# Patient Record
Sex: FEMALE | Race: WHITE
[De-identification: names, ages, dates, MRNs, and addresses within clinical notes are randomized per-mention and may not be internally consistent; named-entity substitution may affect disease eponyms.]

---

## 2018-01-18 ENCOUNTER — RESULT REVIEW (OUTPATIENT)
Age: 58
End: 2018-01-18

## 2018-11-13 ENCOUNTER — RESULT REVIEW (OUTPATIENT)
Age: 58
End: 2018-11-13

## 2021-01-18 ENCOUNTER — APPOINTMENT (OUTPATIENT)
Dept: OTOLARYNGOLOGY | Facility: CLINIC | Age: 61
End: 2021-01-18
Payer: COMMERCIAL

## 2021-01-18 VITALS
SYSTOLIC BLOOD PRESSURE: 131 MMHG | WEIGHT: 245 LBS | DIASTOLIC BLOOD PRESSURE: 80 MMHG | TEMPERATURE: 97.6 F | HEIGHT: 71 IN | BODY MASS INDEX: 34.3 KG/M2

## 2021-01-18 DIAGNOSIS — H69.81 OTHER SPECIFIED DISORDERS OF EUSTACHIAN TUBE, RIGHT EAR: ICD-10-CM

## 2021-01-18 DIAGNOSIS — Z78.9 OTHER SPECIFIED HEALTH STATUS: ICD-10-CM

## 2021-01-18 DIAGNOSIS — Z80.9 FAMILY HISTORY OF MALIGNANT NEOPLASM, UNSPECIFIED: ICD-10-CM

## 2021-01-18 DIAGNOSIS — H92.02 OTALGIA, LEFT EAR: ICD-10-CM

## 2021-01-18 PROCEDURE — 92570 ACOUSTIC IMMITANCE TESTING: CPT

## 2021-01-18 PROCEDURE — 92557 COMPREHENSIVE HEARING TEST: CPT

## 2021-01-18 PROCEDURE — 92588 EVOKED AUDITORY TST COMPLETE: CPT

## 2021-01-18 PROCEDURE — 99072 ADDL SUPL MATRL&STAF TM PHE: CPT

## 2021-01-18 PROCEDURE — 99244 OFF/OP CNSLTJ NEW/EST MOD 40: CPT

## 2021-01-18 PROCEDURE — 92563 TONE DECAY HEARING TEST: CPT

## 2021-01-18 RX ORDER — AZELASTINE HYDROCHLORIDE 137 UG/1
137 SPRAY, METERED NASAL TWICE DAILY
Qty: 1 | Refills: 5 | Status: ACTIVE | COMMUNITY
Start: 2021-01-18 | End: 1900-01-01

## 2021-01-18 NOTE — ASSESSMENT
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 Jung and reviewed with the patient. Astelin - 2 sprays bilaterally BID, spray laterally. ABR. FU after test.

## 2021-01-18 NOTE — ADDENDUM
[FreeTextEntry1] : Documented by Estrella Pena acting as scribe for Dr. Jung on 01/18/2021.\par \par All Medical record entries made by the Scribe were at my, Dr. Jung, direction and personally dictated by me on 01/18/2021 . I have reviewed the chart and agree that the record accurately reflects my personal performance of the history, physical exam, assessment and plan. I have also personally directed, reviewed, and agreed with the discharge instructions.

## 2021-01-18 NOTE — REVIEW OF SYSTEMS
[Seasonal Allergies] : seasonal allergies [Ear Pain] : ear pain [Ear Itch] : ear itch [Ear Noises] : ear noises

## 2021-01-18 NOTE — CONSULT LETTER
[Dear  ___] : Dear  [unfilled], [Courtesy Letter:] : I had the pleasure of seeing your patient, [unfilled], in my office today. [Please see my note below.] : Please see my note below. [Consult Closing:] : Thank you very much for allowing me to participate in the care of this patient.  If you have any questions, please do not hesitate to contact me. [Sincerely,] : Sincerely, [FreeTextEntry3] : Wil Jung MD FACS

## 2021-01-18 NOTE — PHYSICAL EXAM
[Santos Test Lateralizes To Right] : tone lateralization to the right [Midline] : trachea located in midline position [Normal] : no rashes [Hearing Loss Right Only] : normal [Hearing Loss Left Only] : normal [FreeTextEntry6] : no pain with motion of the auricle or pressure on the tragus  [FreeTextEntry7] : no pain with motion of the auricle or pressure on the tragus  [FreeTextEntry1] : open and clear [FreeTextEntry2] : sinuses nontender to percussion

## 2021-01-18 NOTE — HISTORY OF PRESENT ILLNESS
[de-identified] : The patient presents with ringing and buzzing for the past 5 months which she feels like is in her head and sometimes in her ears. She wakes up in the middle of the night with loud ringing. She gets occasional left otalgia. She gets occasional headaches which she doesn't feel is anymore then usual. Denies any problems hearing, FMHx of hearing loss or having a previous hearing test. She had swimmer's ear and constant ear infections as a kid.

## 2021-01-18 NOTE — REASON FOR VISIT
[Initial Consultation] : an initial consultation for [FreeTextEntry2] : ringing and buzzing in ears

## 2021-02-17 ENCOUNTER — APPOINTMENT (OUTPATIENT)
Dept: OTOLARYNGOLOGY | Facility: CLINIC | Age: 61
End: 2021-02-17
Payer: COMMERCIAL

## 2021-02-17 PROCEDURE — 99072 ADDL SUPL MATRL&STAF TM PHE: CPT

## 2021-02-17 PROCEDURE — 92653 AEP NEURODIAGNOSTIC I&R: CPT

## 2021-03-09 ENCOUNTER — APPOINTMENT (OUTPATIENT)
Dept: PHARMACY | Facility: CLINIC | Age: 61
End: 2021-03-09
Payer: SELF-PAY

## 2021-03-09 ENCOUNTER — APPOINTMENT (OUTPATIENT)
Dept: OTOLARYNGOLOGY | Facility: CLINIC | Age: 61
End: 2021-03-09
Payer: COMMERCIAL

## 2021-03-09 VITALS
BODY MASS INDEX: 35 KG/M2 | SYSTOLIC BLOOD PRESSURE: 131 MMHG | DIASTOLIC BLOOD PRESSURE: 80 MMHG | HEIGHT: 71 IN | HEART RATE: 60 BPM | TEMPERATURE: 97.5 F | WEIGHT: 250 LBS

## 2021-03-09 DIAGNOSIS — R51.9 HEADACHE, UNSPECIFIED: ICD-10-CM

## 2021-03-09 DIAGNOSIS — H90.5 UNSPECIFIED SENSORINEURAL HEARING LOSS: ICD-10-CM

## 2021-03-09 DIAGNOSIS — H93.13 TINNITUS, BILATERAL: ICD-10-CM

## 2021-03-09 PROCEDURE — 99213 OFFICE O/P EST LOW 20 MIN: CPT

## 2021-03-09 PROCEDURE — 99072 ADDL SUPL MATRL&STAF TM PHE: CPT

## 2021-03-09 PROCEDURE — V5264D: CUSTOM

## 2021-03-09 RX ORDER — DICLOFENAC SODIUM 1% 10 MG/G
1 GEL TOPICAL
Qty: 300 | Refills: 0 | Status: DISCONTINUED | COMMUNITY
Start: 2020-09-18 | End: 2021-03-09

## 2021-03-09 NOTE — ASSESSMENT
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 Discussed r/b/a of pillow radio, white noise. Arches Tinnitus Formula and Lipoflavonoids. FU with audiology for custom ear plugs. FU 6 months.

## 2021-03-09 NOTE — ADDENDUM
[FreeTextEntry1] : Documented by Estrella Pena acting as scribe for Dr. Jung on 03/09/2021.\par \par All Medical record entries made by the Scribe were at my, Dr. Jung, direction and personally dictated by me on 03/09/2021 . I have reviewed the chart and agree that the record accurately reflects my personal performance of the history, physical exam, assessment and plan. I have also personally directed, reviewed, and agreed with the discharge instructions.

## 2021-03-09 NOTE — HISTORY OF PRESENT ILLNESS
[de-identified] : The patient presents with h/o bilateral tinnitus and left otalgia. Pt presents today for reuslts. Sh estates that she is still getting tinnitus. She feels it may be slightly better. She has been using the Astelin. Pt states that when she swims and gets water in her ears she gets pain.

## 2021-04-01 ENCOUNTER — APPOINTMENT (OUTPATIENT)
Dept: PHARMACY | Facility: CLINIC | Age: 61
End: 2021-04-01
Payer: SELF-PAY

## 2021-04-01 PROCEDURE — V5299A: CUSTOM | Mod: NC

## 2021-07-21 ENCOUNTER — RESULT REVIEW (OUTPATIENT)
Age: 61
End: 2021-07-21

## 2021-09-17 ENCOUNTER — APPOINTMENT (OUTPATIENT)
Dept: OTOLARYNGOLOGY | Facility: CLINIC | Age: 61
End: 2021-09-17